# Patient Record
Sex: FEMALE | Race: WHITE | NOT HISPANIC OR LATINO | Employment: OTHER | URBAN - METROPOLITAN AREA
[De-identification: names, ages, dates, MRNs, and addresses within clinical notes are randomized per-mention and may not be internally consistent; named-entity substitution may affect disease eponyms.]

---

## 2024-01-12 ENCOUNTER — OFFICE VISIT (OUTPATIENT)
Dept: ORTHOPEDIC SURGERY | Facility: CLINIC | Age: 64
End: 2024-01-12
Payer: COMMERCIAL

## 2024-01-12 VITALS — HEIGHT: 65 IN | BODY MASS INDEX: 31.82 KG/M2 | WEIGHT: 191 LBS

## 2024-01-12 DIAGNOSIS — M25.571 RIGHT ANKLE PAIN, UNSPECIFIED CHRONICITY: Primary | ICD-10-CM

## 2024-01-12 RX ORDER — LEVOCETIRIZINE DIHYDROCHLORIDE 5 MG/1
1 TABLET, FILM COATED ORAL EVERY EVENING
COMMUNITY

## 2024-01-12 RX ORDER — VENLAFAXINE HYDROCHLORIDE 37.5 MG/1
CAPSULE, EXTENDED RELEASE ORAL
COMMUNITY
Start: 2024-01-11

## 2024-01-12 RX ORDER — METHION/INOS/CHOL BT/B COM/LIV 110MG-86MG
CAPSULE ORAL
COMMUNITY

## 2024-01-12 RX ORDER — LOSARTAN POTASSIUM AND HYDROCHLOROTHIAZIDE 25; 100 MG/1; MG/1
1 TABLET ORAL DAILY
COMMUNITY

## 2024-01-12 RX ORDER — LEVOTHYROXINE SODIUM 0.12 MG/1
TABLET ORAL
COMMUNITY

## 2024-01-12 RX ORDER — FOLIC ACID 1 MG/1
1 TABLET ORAL DAILY
COMMUNITY
Start: 2024-01-02

## 2024-01-12 RX ORDER — TRAZODONE HYDROCHLORIDE 50 MG/1
TABLET ORAL
COMMUNITY

## 2024-01-12 RX ORDER — FLUTICASONE PROPIONATE 50 MCG
SPRAY, SUSPENSION (ML) NASAL
COMMUNITY

## 2024-01-12 RX ORDER — BUPROPION HYDROCHLORIDE 300 MG/1
TABLET ORAL
COMMUNITY
Start: 2024-01-11

## 2024-01-12 RX ORDER — ATENOLOL 100 MG/1
1 TABLET ORAL DAILY
COMMUNITY

## 2024-01-12 NOTE — PROGRESS NOTES
[facility] – [place] [facility] Office Visit     Office Visit       Date: 01/12/2024   Patient Name: Rhoda Ruggiero  MRN: [id number]  YOB: 1960    Referring Physician: Annie Hoover DPM     Chief Complaint:   Chief Complaint   Patient presents with    Right Ankle - Pain       History of Present Illness: Rhoda Ruggiero is a 63 y.o. female who is here today for right lateral ankle pain and swelling.  States started at some point last year earlier in the year.  Swelling about the lateral ankle as worsened.  It is worse with standing or walking too much.  Better with staying off feet.  Denies any trauma.  Describes some aching and throbbing as well as sharp pains that radiates proximally and distally from the lateral ankle.  Patient is retired.  Patient smokes a half a pack daily.  Reports previously seeing podiatry who made the recommendation to see me.  Has used various shoewear that does help prevent pressure on the large area of swelling and helps with her symptoms.  States she does feel weak about that side of her ankle however it may be related to the pain that she is feeling.  States she does not like the large soft tissue prominence as well as the discomfort it causes however is able to do all of the activities she wants to do.     Subjective   Review of Systems: Review of Systems   Constitutional: Negative.  Negative for chills, fatigue and fever.   HENT: Negative.  Negative for congestion and dental problem.    Eyes: Negative.  Negative for blurred vision.   Respiratory: Negative.  Negative for shortness of breath.    Cardiovascular: Negative.  Negative for leg swelling.   Gastrointestinal: Negative.  Negative for abdominal pain.   Endocrine: Negative.  Negative for polyuria.   Genitourinary: Negative.  Negative for difficulty urinating.   Musculoskeletal:  Positive for arthralgias.   Skin: Negative.    Allergic/Immunologic: Negative.    Neurological: Negative.     Hematological: Negative.  Negative for adenopathy.   Psychiatric/Behavioral: Negative.  Negative for behavioral problems.         Past Medical History:   Past Medical History:   Diagnosis Date    Acid reflux     Ankle sprain     Arthritis of back     Unsure of date first noted    Arthritis of neck     Unsure of date first noted    Fracture, foot     Unsure of date first noted    Heart attack 2000    Hip arthrosis     Unsure of date first noted    Hypertension     Knee swelling     Unsure of date first noted    Osteoarthritis     Periarthritis of shoulder     Unsure of date first noted    Rheumatoid arthritis     Thyroid disease        Past Surgical History:   Past Surgical History:   Procedure Laterality Date    HYSTERECTOMY      TRIGGER POINT INJECTION      Unsure of date first noted       Family History:   Family History   Problem Relation Age of Onset    No Known Problems Mother     Cancer Father         Prostate cancer    Hypertension Father     Heart attack Father     Stroke Father        Social History:   Social History     Socioeconomic History    Marital status: Single   Tobacco Use    Smoking status: Some Days     Packs/day: 0.50     Years: 15.00     Additional pack years: 0.00     Total pack years: 7.50     Types: Cigarettes    Smokeless tobacco: Never   Vaping Use    Vaping Use: Never used   Substance and Sexual Activity    Alcohol use: Yes     Alcohol/week: 6.0 standard drinks of alcohol     Types: 6 Cans of beer per week    Drug use: Never    Sexual activity: Not Currently     Partners: Male     Birth control/protection: Hysterectomy       Medications:   Current Outpatient Medications:     atenolol (TENORMIN) 100 MG tablet, Take 1 tablet by mouth Daily., Disp: , Rfl:     buPROPion XL (WELLBUTRIN XL) 300 MG 24 hr tablet, , Disp: , Rfl:     esomeprazole (nexIUM) 20 MG capsule, Take 1 capsule by mouth Every Morning Before Breakfast., Disp: , Rfl:     fluticasone (FLONASE) 50 MCG/ACT nasal spray, USE 1  "SPRAY IN EACH NOSTRIL ONCE DAILY AS NEEDED, Disp: , Rfl:     folic acid (FOLVITE) 1 MG tablet, Take 1 tablet by mouth Daily., Disp: , Rfl:     levocetirizine (XYZAL) 5 MG tablet, Take 1 tablet by mouth Every Evening., Disp: , Rfl:     levothyroxine (SYNTHROID, LEVOTHROID) 125 MCG tablet, TAKE 1 TABLET BY MOUTH ONCE DAILY IN THE MORNING ON AN EMPTY STOMACH, Disp: , Rfl:     losartan-hydrochlorothiazide (HYZAAR) 100-25 MG per tablet, Take 1 tablet by mouth Daily., Disp: , Rfl:     thiamine (VITAMIN B-1) 100 MG tablet tablet, , Disp: , Rfl:     traZODone (DESYREL) 50 MG tablet, TAKE 1 & 1/2 (ONE & ONE-HALF) TABLETS BY MOUTH ONCE DAILY AT BEDTIME AS NEEDED, Disp: , Rfl:     venlafaxine XR (EFFEXOR-XR) 37.5 MG 24 hr capsule, , Disp: , Rfl:     Allergies:   Allergies   Allergen Reactions    Tolmetin Anaphylaxis    Penicillins Rash       I reviewed the patient's chief complaint, history of present illness, review of systems, past medical history, surgical history, family history, social history, medications and allergy list.     Objective    Vital Signs:   Vitals:    01/12/24 1112   Weight: 86.6 kg (191 lb)   Height: 164.5 cm (64.75\")     Body mass index is 32.03 kg/m².    Ortho Exam:  right LE Foot and Ankle Exam:   Normal gait pattern. Hindfoot alignment is neutral. Plantigrade foot.   Neurological exam of the superficial peroneal, deep peroneal, plantar, sural and saphenous nerves demonstrates intact sensation and normal motor function.   10/10 sites felt on monofilament testing of foot.   There is good perfusion to the toes.   The skin is intact throughout the foot and ankle without ulceration.   Range of motion of ankle, subtalar joint, midfoot and toes is within normal limits.   There large firm soft tissue mass in the area just anterior to the distal fibula about 2 to 3 cm in diameter that is mobile.  There is some slight tenderness around the mass and over the adjacent peroneal tendons.  Patient does have fairly " good strength with resisted forefoot eversion with the ankle in both plantarflexion and dorsiflexion relative to the contralateral, seems as though she does have some weakness compared to the other side however it may simply be limited due to discomfort.    Results Review:   Imaging Results (Last 24 Hours)       Procedure Component Value Units Date/Time    XR Ankle 3+ View Right [[id number]] Resulted: 01/12/24 1255     Updated: 01/12/24 1256    Narrative:      Right Ankle X-Ray 01/12/24   Indication: Pain  Views: 3 weight bearing , comparison none  Findings: xrays reviewed by me today in the office and show, No acute   osseous abnormality, No bony lesion, Normal joint spaces with mortise   well-aligned, no evidence of syndesmosis widening, degenerative changes   consistent with age            01/12/24 I have personally reviewed and interpreted the images from outside facility with the documented findings, right ankle MRI from December 13 at [facility] radiology reviewed in clinic today which shows cystic mass associated with the peroneal tendon complex with the appearance of a ganglion cyst and there seems to be some associated tearing of the peroneus brevis tendon although difficult to identify entirely on MRI imaging and with the cyst present    Assessment / Plan    Assessment/Plan:   Diagnoses and all orders for this visit:    1. Right ankle pain, unspecified chronicity (Primary)  -     XR Ankle 3+ View Right      Discussed right ankle pain/mass (undiagnosed new problem with uncertain prognosis) with patient as well as treatment options at length. Decision regarding surgical intervention considered.  Discussed with patient at length that the presence of this mass could justify surgical intervention however this is an elective procedure and the patient smokes a half a pack per day.  Discussed with patient that if she would like to further pursue surgery with me she would need to quit smoking at least 6 weeks  prior to the procedure and then take a nicotine test prior to scheduling the surgery.  Patient does report that she is able to do most of her activities and life with this cyst present and she also seems to still have preservation of function of her peroneal tendons.  She may or may not have a tear in the brevis tendon and I did tell her if of her tear is present it could get worse however it does not change my recommendation.  Following our discussion the patient expressed understanding and the plan moving forward would be her returning on an as-needed basis.  It was a pleasure seeing her today.    Follow Up:   Return if symptoms worsen or fail to improve.      Troy Orlando MD  [facility] [place] [facility]